# Patient Record
Sex: FEMALE | Race: WHITE | Employment: FULL TIME
[De-identification: names, ages, dates, MRNs, and addresses within clinical notes are randomized per-mention and may not be internally consistent; named-entity substitution may affect disease eponyms.]

---

## 2017-10-01 ENCOUNTER — HEALTH MAINTENANCE LETTER (OUTPATIENT)
Age: 22
End: 2017-10-01

## 2018-04-08 ENCOUNTER — HEALTH MAINTENANCE LETTER (OUTPATIENT)
Age: 23
End: 2018-04-08

## 2018-10-26 ENCOUNTER — HOSPITAL ENCOUNTER (EMERGENCY)
Facility: CLINIC | Age: 23
End: 2018-10-26

## 2018-10-26 ENCOUNTER — APPOINTMENT (OUTPATIENT)
Dept: ULTRASOUND IMAGING | Facility: CLINIC | Age: 23
End: 2018-10-26
Attending: PHYSICIAN ASSISTANT
Payer: COMMERCIAL

## 2018-10-26 ENCOUNTER — HOSPITAL ENCOUNTER (EMERGENCY)
Facility: CLINIC | Age: 23
Discharge: HOME | End: 2018-10-26
Attending: EMERGENCY MEDICINE | Admitting: EMERGENCY MEDICINE
Payer: COMMERCIAL

## 2018-10-26 ENCOUNTER — APPOINTMENT (OUTPATIENT)
Dept: GENERAL RADIOLOGY | Facility: CLINIC | Age: 23
End: 2018-10-26
Attending: PHYSICIAN ASSISTANT
Payer: COMMERCIAL

## 2018-10-26 VITALS
BODY MASS INDEX: 31.86 KG/M2 | RESPIRATION RATE: 16 BRPM | DIASTOLIC BLOOD PRESSURE: 73 MMHG | SYSTOLIC BLOOD PRESSURE: 142 MMHG | OXYGEN SATURATION: 97 % | HEIGHT: 67 IN | WEIGHT: 203 LBS | TEMPERATURE: 99 F

## 2018-10-26 DIAGNOSIS — R10.12 LUQ ABDOMINAL PAIN: ICD-10-CM

## 2018-10-26 DIAGNOSIS — M25.512 CHRONIC LEFT SHOULDER PAIN: ICD-10-CM

## 2018-10-26 DIAGNOSIS — G89.29 CHRONIC LEFT SHOULDER PAIN: ICD-10-CM

## 2018-10-26 DIAGNOSIS — R07.89 ATYPICAL CHEST PAIN: ICD-10-CM

## 2018-10-26 LAB
ALBUMIN SERPL-MCNC: 4 G/DL (ref 3.4–5)
ALP SERPL-CCNC: 73 U/L (ref 40–150)
ALT SERPL W P-5'-P-CCNC: 22 U/L (ref 0–50)
ANION GAP SERPL CALCULATED.3IONS-SCNC: 9 MMOL/L (ref 3–14)
AST SERPL W P-5'-P-CCNC: 13 U/L (ref 0–45)
BASOPHILS # BLD AUTO: 0.1 10E9/L (ref 0–0.2)
BASOPHILS NFR BLD AUTO: 1.1 %
BILIRUB SERPL-MCNC: 0.4 MG/DL (ref 0.2–1.3)
BUN SERPL-MCNC: 9 MG/DL (ref 7–30)
CALCIUM SERPL-MCNC: 8.8 MG/DL (ref 8.5–10.1)
CHLORIDE SERPL-SCNC: 110 MMOL/L (ref 94–109)
CO2 SERPL-SCNC: 23 MMOL/L (ref 20–32)
CREAT SERPL-MCNC: 0.68 MG/DL (ref 0.52–1.04)
DIFFERENTIAL METHOD BLD: ABNORMAL
EOSINOPHIL # BLD AUTO: 0.2 10E9/L (ref 0–0.7)
EOSINOPHIL NFR BLD AUTO: 2.5 %
ERYTHROCYTE [DISTWIDTH] IN BLOOD BY AUTOMATED COUNT: 15.4 % (ref 10–15)
GFR SERPL CREATININE-BSD FRML MDRD: >90 ML/MIN/1.7M2
GLUCOSE SERPL-MCNC: 84 MG/DL (ref 70–99)
HCT VFR BLD AUTO: 36.5 % (ref 35–47)
HGB BLD-MCNC: 11.7 G/DL (ref 11.7–15.7)
IMM GRANULOCYTES # BLD: 0 10E9/L (ref 0–0.4)
IMM GRANULOCYTES NFR BLD: 0.5 %
INTERPRETATION ECG - MUSE: NORMAL
LIPASE SERPL-CCNC: 547 U/L (ref 73–393)
LYMPHOCYTES # BLD AUTO: 1.4 10E9/L (ref 0.8–5.3)
LYMPHOCYTES NFR BLD AUTO: 21.4 %
MCH RBC QN AUTO: 26.8 PG (ref 26.5–33)
MCHC RBC AUTO-ENTMCNC: 32.1 G/DL (ref 31.5–36.5)
MCV RBC AUTO: 84 FL (ref 78–100)
MONOCYTES # BLD AUTO: 0.3 10E9/L (ref 0–1.3)
MONOCYTES NFR BLD AUTO: 5.1 %
NEUTROPHILS # BLD AUTO: 4.5 10E9/L (ref 1.6–8.3)
NEUTROPHILS NFR BLD AUTO: 69.4 %
NRBC # BLD AUTO: 0 10*3/UL
NRBC BLD AUTO-RTO: 0 /100
PLATELET # BLD AUTO: 351 10E9/L (ref 150–450)
POTASSIUM SERPL-SCNC: 3.8 MMOL/L (ref 3.4–5.3)
PROT SERPL-MCNC: 7.2 G/DL (ref 6.8–8.8)
RBC # BLD AUTO: 4.37 10E12/L (ref 3.8–5.2)
SODIUM SERPL-SCNC: 142 MMOL/L (ref 133–144)
TROPONIN I SERPL-MCNC: <0.015 UG/L (ref 0–0.04)
WBC # BLD AUTO: 6.5 10E9/L (ref 4–11)

## 2018-10-26 PROCEDURE — 93005 ELECTROCARDIOGRAM TRACING: CPT

## 2018-10-26 PROCEDURE — 71046 X-RAY EXAM CHEST 2 VIEWS: CPT

## 2018-10-26 PROCEDURE — 25000132 ZZH RX MED GY IP 250 OP 250 PS 637: Performed by: PHYSICIAN ASSISTANT

## 2018-10-26 PROCEDURE — 83690 ASSAY OF LIPASE: CPT | Performed by: PHYSICIAN ASSISTANT

## 2018-10-26 PROCEDURE — 85025 COMPLETE CBC W/AUTO DIFF WBC: CPT | Performed by: PHYSICIAN ASSISTANT

## 2018-10-26 PROCEDURE — 25000125 ZZHC RX 250: Performed by: PHYSICIAN ASSISTANT

## 2018-10-26 PROCEDURE — 84484 ASSAY OF TROPONIN QUANT: CPT | Performed by: PHYSICIAN ASSISTANT

## 2018-10-26 PROCEDURE — 25000128 H RX IP 250 OP 636: Performed by: PHYSICIAN ASSISTANT

## 2018-10-26 PROCEDURE — 96374 THER/PROPH/DIAG INJ IV PUSH: CPT

## 2018-10-26 PROCEDURE — 80053 COMPREHEN METABOLIC PANEL: CPT | Performed by: PHYSICIAN ASSISTANT

## 2018-10-26 PROCEDURE — 76705 ECHO EXAM OF ABDOMEN: CPT

## 2018-10-26 PROCEDURE — 99285 EMERGENCY DEPT VISIT HI MDM: CPT | Mod: 25

## 2018-10-26 RX ORDER — LIDOCAINE 50 MG/G
PATCH TOPICAL
Qty: 30 PATCH | Refills: 0 | Status: SHIPPED | OUTPATIENT
Start: 2018-10-26

## 2018-10-26 RX ORDER — ACETAMINOPHEN 325 MG/1
650 TABLET ORAL ONCE
Status: COMPLETED | OUTPATIENT
Start: 2018-10-26 | End: 2018-10-26

## 2018-10-26 RX ORDER — LORAZEPAM 2 MG/ML
1 INJECTION INTRAMUSCULAR ONCE
Status: COMPLETED | OUTPATIENT
Start: 2018-10-26 | End: 2018-10-26

## 2018-10-26 RX ADMIN — ACETAMINOPHEN 650 MG: 325 TABLET, FILM COATED ORAL at 16:10

## 2018-10-26 RX ADMIN — LORAZEPAM 1 MG: 2 INJECTION, SOLUTION INTRAMUSCULAR; INTRAVENOUS at 16:11

## 2018-10-26 RX ADMIN — LIDOCAINE HYDROCHLORIDE 15 ML: 20 SOLUTION ORAL; TOPICAL at 16:10

## 2018-10-26 ASSESSMENT — ENCOUNTER SYMPTOMS
FEVER: 0
CHILLS: 0
MYALGIAS: 1
ABDOMINAL PAIN: 1
DIARRHEA: 0
COUGH: 0
SHORTNESS OF BREATH: 0
VOMITING: 0
NAUSEA: 0
CONSTIPATION: 0

## 2018-10-26 NOTE — ED PROVIDER NOTES
"Emergency Department Attending Supervision Note  10/26/2018  3:28 PM      I evaluated this patient in conjunction with Wendy Castillo PA-c      Briefly, the patient presented with shoulder pain, which is chronic since getting perforated ulcer in stomach. 1 hour prior to arrival. Sudden right chest pain, now pressure like. Worse with movements. No family history of blood clots.  No hemoptysis. No estrogens.    She reports chronic LUQ pain, which is worse over the past 1 week.    On my exam,   /73  Temp 99  F (37.2  C) (Oral)  Resp 16  Ht 1.702 m (5' 7\")  Wt 92.1 kg (203 lb)  SpO2 97%  BMI 31.79 kg/m2     Tearful, anxious  Reproducible right chest pain  LUQ tenderness  Breath sounds normal and equal  No lower extremity swelling.    Results:  Labs Ordered and Resulted from Time of ED Arrival Up to the Time of Departure from the ED   CBC WITH PLATELETS DIFFERENTIAL - Abnormal; Notable for the following:        Result Value    RDW 15.4 (*)     All other components within normal limits   COMPREHENSIVE METABOLIC PANEL - Abnormal; Notable for the following:     Chloride 110 (*)     All other components within normal limits   LIPASE - Abnormal; Notable for the following:     Lipase 547 (*)     All other components within normal limits   TROPONIN I   PERIPHERAL IV CATHETER      US Abdomen Limited (RUQ)   Final Result   IMPRESSION: Normal right upper quadrant ultrasound.       ALEX CARDOSO MD      XR Chest 2 Views   Final Result   IMPRESSION: Normal two views of the chest.       ALEX CARDOSO MD         ED course:    Pinky Grimes is a 23 year old female here with right-sided chest pain.  Is very reproducible on exam.  EKG shows no acute ischemic changes.  She also is having pain in her left shoulder and left upper quadrant which is more chronic for her.  It has been slightly increased over the past week.  Troponin is undetectable.  She is PERC negative.  Lipase did return slightly elevated " although not 3 times normal.  Unclear of the clinical significance of this.  Ultrasound was obtained and showed a normal right upper quadrant ultrasound.  Recommended follow-up with primary care clinician next week.    Diagnosis    ICD-10-CM    1. Atypical chest pain R07.89    2. LUQ abdominal pain R10.12    3. Chronic left shoulder pain M25.512     G89.29          Jaime Gonzalez MD [place], Aaron Joseph, MD  10/26/18 2228

## 2018-10-26 NOTE — ED AVS SNAPSHOT
Emergency Department    [address] [place] [address]    Phone:  [phone]    Fax:  [phone]                                       Pinky Grimes   MRN: [id number]    Department:   Emergency Department   Date of Visit:  10/26/2018           After Visit Summary Signature Page     I have received my discharge instructions, and my questions have been answered. I have discussed any challenges I see with this plan with the nurse or doctor.    ..........................................................................................................................................  Patient/Patient Representative Signature      ..........................................................................................................................................  Patient Representative Print Name and Relationship to Patient    ..................................................               ................................................  Date                                   Time    ..........................................................................................................................................  Reviewed by Signature/Title    ...................................................              ..............................................  Date                                               Time          22EPIC Rev 08/18

## 2018-10-26 NOTE — DISCHARGE INSTRUCTIONS
Begin taking the omeprazole as prescribed.  A list of local primary care providers has been provided to you.  Call a clinic within the next few days to establish care.  Also have your lipase rechecked with this provider.  Continue to use Tylenol, heat, gentle stretching for your shoulder and chest discomfort.  The lidocaine patches of the shoulder can also help with symptoms.  Wear these for 12 hours at a time.  Then  keep him off for 12 hours.  Return to the ED for any changing or worsening symptoms, worsening chest pain, shortness of breath, uncontrolled nausea or vomiting, fevers >101, new concerns.    Discharge Instructions  Chest Pain    You have been seen today for chest pain or discomfort.  At this time, your doctor has found no signs that your chest pain is due to a serious or life-threatening condition, (or you have declined more testing and/or admission to the hospital). However, sometimes there is a serious problem that does not show up right away. Your evaluation today may not be complete and you may need further testing and evaluation.     You need to follow-up with your regular doctor within 3 days.    Return to the Emergency Department if:    Your chest pain changes, gets worse, starts to happen more often, or comes with less activity.    You are short of breath.    You get very weak or tired.    You pass out or faint.    You have any new symptoms, like fever, cough, numb legs, or you cough up blood.    You have anything else that worries you.    Until you follow-up with your regular doctor please do the following:    Take one aspirin daily unless you have an allergy or are told not to by your doctor.    If a stress test appointment has been made, go to the appointment.    If you have questions, contact your regular doctor.    If your doctor today has told you to follow-up with your regular doctor, it is very important that you make an appointment with your clinic and go to the appointment.  If you do  not follow-up with your primary doctor, it may result in missing an important development which could result in permanent injury or disability and/or lasting pain.  If there is any problem keeping your appointment, call your doctor or return to the Emergency Department.    If you were given a prescription for medicine here today, be sure to read all of the information (including the package insert) that comes with your prescription.  This will include important information about the medicine, its side effects, and any warnings that you need to know about.  The pharmacist who fills the prescription can provide more information and answer questions you may have about the medicine.  If you have questions or concerns that the pharmacist cannot address, please call or return to the Emergency Department.     Opioid Medication Information    Pain medications are among the most commonly prescribed medicines, so we are including this information for all our patients. If you did not receive pain medication or get a prescription for pain medicine, you can ignore it.     You may have been given a prescription for an opioid (narcotic) pain medicine and/or have received a pain medicine while here in the Emergency Department. These medicines can make you drowsy or impaired. You must not drive, operate dangerous equipment, or engage in any other dangerous activities while taking these medications. If you drive while taking these medications, you could be arrested for DUI, or driving under the influence. Do not drink any alcohol while you are taking these medications.     Opioid pain medications can cause addiction. If you have a history of chemical dependency of any type, you are at a higher risk of becoming addicted to pain medications.  Only take these prescribed medications to treat your pain when all other options have been tried. Take it for as short a time and as few doses as possible. Store your pain pills in a secure place,  as they are frequently stolen and provide a dangerous opportunity for children or visitors in your house to start abusing these powerful medications. We will not replace any lost or stolen medicine.  As soon as your pain is better, you should flush all your remaining medication.     Many prescription pain medications contain Tylenol  (acetaminophen), including Vicodin , Tylenol #3 , Norco , Lortab , and Percocet .  You should not take any extra pills of Tylenol  if you are using these prescription medications or you can get very sick.  Do not ever take more than 3000 mg of acetaminophen in any 24 hour period.    All opioids tend to cause constipation. Drink plenty of water and eat foods that have a lot of fiber, such as fruits, vegetables, prune juice, apple juice and high fiber cereal.  Take a laxative if you don t move your bowels at least every other day. Miralax , Milk of Magnesia, Colace , or Senna  can be used to keep you regular.      Remember that you can always come back to the Emergency Department if you are not able to see your regular doctor in the amount of time listed above, if you get any new symptoms, or if there is anything that worries you.

## 2018-10-26 NOTE — ED AVS SNAPSHOT
Emergency Department    [address] [facility] [address]    Phone:  [phone]    Fax:  [phone]                                       Pinky Grimes   MRN: [id number]    Department:   Emergency Department   Date of Visit:  10/26/2018           Patient Information     Date Of Birth          1995        Your diagnoses for this visit were:     Atypical chest pain     LUQ abdominal pain     Chronic left shoulder pain        You were seen by Jaime Gonzalez MD.      Follow-up Information     Go to  Emergency Department.    Specialty:  EMERGENCY MEDICINE    Why:  If symptoms worsen    Contact information:    [address] [facility] [address] [phone]        Follow up with [facility]. Call in 1 day.    Specialties:  Podiatry, Internal Medicine, Family Medicine    Why:  Or call one of the clinics on the handout provided to you    Contact information:    [address] [address] [phone]        Discharge Instructions       Begin taking the omeprazole as prescribed.  A list of local primary care providers has been provided to you.  Call a clinic within the next few days to establish care.  Also have your lipase rechecked with this provider.  Continue to use Tylenol, heat, gentle stretching for your shoulder and chest discomfort.  The lidocaine patches of the shoulder can also help with symptoms.  Wear these for 12 hours at a time.  Then  keep him off for 12 hours.  Return to the ED for any changing or worsening symptoms, worsening chest pain, shortness of breath, uncontrolled nausea or vomiting, fevers >101, new concerns.    Discharge Instructions  Chest Pain    You have been seen today for chest pain or discomfort.  At this time, your doctor has found no signs that your chest pain is due to a serious or life-threatening condition, (or you have declined more testing and/or admission to the hospital).  However, sometimes there is a serious problem that does not show up right away. Your evaluation today may not be complete and you may need further testing and evaluation.     You need to follow-up with your regular doctor within 3 days.    Return to the Emergency Department if:    Your chest pain changes, gets worse, starts to happen more often, or comes with less activity.    You are short of breath.    You get very weak or tired.    You pass out or faint.    You have any new symptoms, like fever, cough, numb legs, or you cough up blood.    You have anything else that worries you.    Until you follow-up with your regular doctor please do the following:    Take one aspirin daily unless you have an allergy or are told not to by your doctor.    If a stress test appointment has been made, go to the appointment.    If you have questions, contact your regular doctor.    If your doctor today has told you to follow-up with your regular doctor, it is very important that you make an appointment with your clinic and go to the appointment.  If you do not follow-up with your primary doctor, it may result in missing an important development which could result in permanent injury or disability and/or lasting pain.  If there is any problem keeping your appointment, call your doctor or return to the Emergency Department.    If you were given a prescription for medicine here today, be sure to read all of the information (including the package insert) that comes with your prescription.  This will include important information about the medicine, its side effects, and any warnings that you need to know about.  The pharmacist who fills the prescription can provide more information and answer questions you may have about the medicine.  If you have questions or concerns that the pharmacist cannot address, please call or return to the Emergency Department.     Opioid Medication Information    Pain medications are among the most commonly  prescribed medicines, so we are including this information for all our patients. If you did not receive pain medication or get a prescription for pain medicine, you can ignore it.     You may have been given a prescription for an opioid (narcotic) pain medicine and/or have received a pain medicine while here in the Emergency Department. These medicines can make you drowsy or impaired. You must not drive, operate dangerous equipment, or engage in any other dangerous activities while taking these medications. If you drive while taking these medications, you could be arrested for DUI, or driving under the influence. Do not drink any alcohol while you are taking these medications.     Opioid pain medications can cause addiction. If you have a history of chemical dependency of any type, you are at a higher risk of becoming addicted to pain medications.  Only take these prescribed medications to treat your pain when all other options have been tried. Take it for as short a time and as few doses as possible. Store your pain pills in a secure place, as they are frequently stolen and provide a dangerous opportunity for children or visitors in your house to start abusing these powerful medications. We will not replace any lost or stolen medicine.  As soon as your pain is better, you should flush all your remaining medication.     Many prescription pain medications contain Tylenol  (acetaminophen), including Vicodin , Tylenol #3 , Norco , Lortab , and Percocet .  You should not take any extra pills of Tylenol  if you are using these prescription medications or you can get very sick.  Do not ever take more than 3000 mg of acetaminophen in any 24 hour period.    All opioids tend to cause constipation. Drink plenty of water and eat foods that have a lot of fiber, such as fruits, vegetables, prune juice, apple juice and high fiber cereal.  Take a laxative if you don t move your bowels at least every other day. Miralax , Milk of  Magnesia, Colace , or Senna  can be used to keep you regular.      Remember that you can always come back to the Emergency Department if you are not able to see your regular doctor in the amount of time listed above, if you get any new symptoms, or if there is anything that worries you.      Discharge References/Attachments     EPIGASTRIC PAIN (UNCERTAIN CAUSE) (ENGLISH)    CHEST WALL PAIN, COSTOCHONDRITIS (ENGLISH)      24 Hour Appointment Hotline       To make an appointment at any [facility], call [phone] ([phone]). If you don't have a family doctor or clinic, we will help you find one. [facility] are conveniently located to serve the needs of you and your family.             Review of your medicines      START taking        Dose / Directions Last dose taken    lidocaine 5 % Patch   Commonly known as:  LIDODERM   Quantity:  30 patch        Apply up to 3 patches to painful area at once for up to 12 h within a 24 h period.  Remove after 12 hours.   Refills:  0        omeprazole 20 MG CR capsule   Commonly known as:  priLOSEC   Dose:  20 mg   Quantity:  30 capsule        Take 1 capsule (20 mg) by mouth daily   Refills:  0                Prescriptions were sent or printed at these locations (2 Prescriptions)                   [facility] [address] [place] [address] AT SEC OF [address] [address]    Telephone:  [phone]   Fax:  [phone]   Hours:                  E-Prescribed (2 of 2)         lidocaine (LIDODERM) 5 % Patch               omeprazole (PRILOSEC) 20 MG CR capsule                Procedures and tests performed during your visit     CBC with platelets differential    Comprehensive metabolic panel    EKG 12 lead    Lipase    Peripheral IV catheter    Troponin I    US Abdomen Limited (RUQ)    XR Chest 2 Views      Orders Needing Specimen Collection     None      Pending Results     No orders found from  10/24/2018 to 10/27/2018.            Pending Culture Results     No orders found from 10/24/2018 to 10/27/2018.            Pending Results Instructions     If you had any lab results that were not finalized at the time of your Discharge, you can call the ED Lab Result RN at [phone]. You will be contacted by this team for any positive Lab results or changes in treatment. The nurses are available 7 days a week from 10A to 6:30P.  You can leave a message 24 hours per day and they will return your call.        Test Results From Your Hospital Stay        10/26/2018  4:56 PM      Component Results     Component Value Ref Range & Units Status    WBC 6.5 4.0 - 11.0 10e9/L Final    RBC Count 4.37 3.8 - 5.2 10e12/L Final    Hemoglobin 11.7 11.7 - 15.7 g/dL Final    Hematocrit 36.5 35.0 - 47.0 % Final    MCV 84 78 - 100 fl Final    MCH 26.8 26.5 - 33.0 pg Final    MCHC 32.1 31.5 - 36.5 g/dL Final    RDW 15.4 (H) 10.0 - 15.0 % Final    Platelet Count 351 150 - 450 10e9/L Final    Diff Method Automated Method  Final    % Neutrophils 69.4 % Final    % Lymphocytes 21.4 % Final    % Monocytes 5.1 % Final    % Eosinophils 2.5 % Final    % Basophils 1.1 % Final    % Immature Granulocytes 0.5 % Final    Nucleated RBCs 0 0 /100 Final    Absolute Neutrophil 4.5 1.6 - 8.3 10e9/L Final    Absolute Lymphocytes 1.4 0.8 - 5.3 10e9/L Final    Absolute Monocytes 0.3 0.0 - 1.3 10e9/L Final    Absolute Eosinophils 0.2 0.0 - 0.7 10e9/L Final    Absolute Basophils 0.1 0.0 - 0.2 10e9/L Final    Abs Immature Granulocytes 0.0 0 - 0.4 10e9/L Final    Absolute Nucleated RBC 0.0  Final         10/26/2018  4:44 PM      Component Results     Component Value Ref Range & Units Status    Sodium 142 133 - 144 mmol/L Final    Potassium 3.8 3.4 - 5.3 mmol/L Final    Chloride 110 (H) 94 - 109 mmol/L Final    Carbon Dioxide 23 20 - 32 mmol/L Final    Anion Gap 9 3 - 14 mmol/L Final    Glucose 84 70 - 99 mg/dL Final    Urea Nitrogen 9 7 - 30 mg/dL Final     Creatinine 0.68 0.52 - 1.04 mg/dL Final    GFR Estimate >90 >60 mL/min/1.7m2 Final    Non  GFR Calc    GFR Estimate If Black >90 >60 mL/min/1.7m2 Final    African American GFR Calc    Calcium 8.8 8.5 - 10.1 mg/dL Final    Bilirubin Total 0.4 0.2 - 1.3 mg/dL Final    Albumin 4.0 3.4 - 5.0 g/dL Final    Protein Total 7.2 6.8 - 8.8 g/dL Final    Alkaline Phosphatase 73 40 - 150 U/L Final    ALT 22 0 - 50 U/L Final    AST 13 0 - 45 U/L Final         10/26/2018  4:39 PM      Component Results     Component Value Ref Range & Units Status    Lipase 547 (H) 73 - 393 U/L Final         10/26/2018  4:44 PM      Component Results     Component Value Ref Range & Units Status    Troponin I ES <0.015 0.000 - 0.045 ug/L Final    The 99th percentile for upper reference range is 0.045 ug/L.  Troponin values   in the range of 0.045 - 0.120 ug/L may be associated with risks of adverse   clinical events.           10/26/2018  4:32 PM      Narrative     CHEST TWO VIEWS 10/26/2018 4:25 PM     HISTORY: Chest pain.     COMPARISON: None.    FINDINGS: Heart size and pulmonary vascularity are within normal  limits. The lungs are clear. No pneumothorax or pleural effusion.         Impression     IMPRESSION: Normal two views of the chest.     ALEX CARDOSO MD         10/26/2018  6:23 PM      Narrative     RIGHT UPPER QUADRANT ULTRASOUND 10/26/2018 6:07 PM    HISTORY: Mild right upper quadrant pain, largely left upper quadrant  pain and mildly elevated lipase.     COMPARISON: None.    FINDINGS:   Gallbladder: Normal with no cholelithiasis, wall thickening or focal  tenderness.     Bile ducts: CHD is normal diameter. No intrahepatic biliary  dilatation.    Liver: Normal.     Pancreas: Normal.     Right kidney: Normal.         Impression     IMPRESSION: Normal right upper quadrant ultrasound.     ALEX CARDOSO MD                Clinical Quality Measure: Blood Pressure Screening     Your blood pressure was checked while you  "were in the emergency department today. The last reading we obtained was  BP: 142/73 . Please read the guidelines below about what these numbers mean and what you should do about them.  If your systolic blood pressure (the top number) is less than 120 and your diastolic blood pressure (the bottom number) is less than 80, then your blood pressure is normal. There is nothing more that you need to do about it.  If your systolic blood pressure (the top number) is 120-139 or your diastolic blood pressure (the bottom number) is 80-89, your blood pressure may be higher than it should be. You should have your blood pressure rechecked within a year by a primary care provider.  If your systolic blood pressure (the top number) is 140 or greater or your diastolic blood pressure (the bottom number) is 90 or greater, you may have high blood pressure. High blood pressure is treatable, but if left untreated over time it can put you at risk for heart attack, stroke, or kidney failure. You should have your blood pressure rechecked by a primary care provider within the next 4 weeks.  If your provider in the emergency department today gave you specific instructions to follow-up with your doctor or provider even sooner than that, you should follow that instruction and not wait for up to 4 weeks for your follow-up visit.        Thank you for choosing [facility]       Thank you for choosing [facility] for your care. Our goal is always to provide you with excellent care. Hearing back from our patients is one way we can continue to improve our services. Please take a few minutes to complete the written survey that you may receive in the mail after you visit with us. Thank you!        [facility] Inchart Information     [email or web address] lets you send messages to your doctor, view your test results, renew your prescriptions, schedule appointments and more. To sign up, go to [email or web address] . Click on \"Log in\" on the left side of the screen, which will take " "you to the Welcome page. Then click on \"Sign up Now\" on the right side of the page.     You will be asked to enter the access code listed below, as well as some personal information. Please follow the directions to create your username and password.     Your access code is: [email or web address]  Expires: 2019  4:26 PM     Your access code will  in 90 days. If you need help or a new code, please call your [facility] clinic or [phone].        Care EveryWhere ID     This is your Care EveryWhere ID. This could be used by other organizations to access your [facility] medical records  [id number]        Equal Access to Services     CUCO BHANDARI : Samy Hampton, leopoldo steven, teresa bell, velia mcelroy. [facility] [phone].    ATENCIÓN: Si habla español, tiene a sandoval disposición servicios gratuitos de asistencia lingüística. Llame al [phone].    We comply with applicable federal civil rights laws and [place] laws. We do not discriminate on the basis of race, color, national origin, age, disability, sex, sexual orientation, or gender identity.            After Visit Summary       This is your record. Keep this with you and show to your community pharmacist(s) and doctor(s) at your next visit.                  "

## 2019-06-25 ENCOUNTER — HOSPITAL ENCOUNTER (EMERGENCY)
Facility: CLINIC | Age: 24
Discharge: HOME | End: 2019-06-25
Attending: EMERGENCY MEDICINE | Admitting: EMERGENCY MEDICINE
Payer: COMMERCIAL

## 2019-06-25 VITALS
RESPIRATION RATE: 16 BRPM | TEMPERATURE: 98.6 F | SYSTOLIC BLOOD PRESSURE: 135 MMHG | BODY MASS INDEX: 32.18 KG/M2 | HEIGHT: 67 IN | WEIGHT: 205 LBS | DIASTOLIC BLOOD PRESSURE: 80 MMHG | OXYGEN SATURATION: 99 %

## 2019-06-25 DIAGNOSIS — F41.1 ANXIETY REACTION: ICD-10-CM

## 2019-06-25 PROCEDURE — 25000132 ZZH RX MED GY IP 250 OP 250 PS 637: Performed by: EMERGENCY MEDICINE

## 2019-06-25 PROCEDURE — 99283 EMERGENCY DEPT VISIT LOW MDM: CPT

## 2019-06-25 RX ORDER — LORAZEPAM 1 MG/1
1 TABLET ORAL ONCE
Status: COMPLETED | OUTPATIENT
Start: 2019-06-25 | End: 2019-06-25

## 2019-06-25 RX ORDER — LORAZEPAM 1 MG/1
1 TABLET ORAL
Qty: 5 TABLET | Refills: 0 | Status: SHIPPED | OUTPATIENT
Start: 2019-06-25

## 2019-06-25 RX ADMIN — LORAZEPAM 1 MG: 1 TABLET ORAL at 14:30

## 2019-06-25 ASSESSMENT — ENCOUNTER SYMPTOMS: NERVOUS/ANXIOUS: 1

## 2019-06-25 ASSESSMENT — MIFFLIN-ST. JEOR: SCORE: 1722.5

## 2019-06-25 NOTE — ED NOTES
Bed: ED16  Expected date: 6/25/19  Expected time: 1:40 PM  Means of arrival:   Comments:  TRIAGE

## 2019-06-25 NOTE — ED PROVIDER NOTES
"  History     Chief Complaint:  Anxiety      HPI   Pinky Grimes is a 24 year old female who presents to the emergency department today for evaluation of anxiety.  She reports symptoms of feeling shaky, jittery, hard to function, emotional, out of control crying, and the inability to sit still. She has been hospitalized two times for this in the past year. She is concerned today that her medications may need to be adjusted or that her recent adjustments are affecting her.    Allergies:  Amoxicillin     Medications:    Ativan  Lidoderm    Past Medical History:    Bipolar disorder  GHADA  Marijuana abuse    Past Surgical History:    Cyst Aspiration    Family History:    No family history on file.    Social History:  Smoking Status: never  Smokeless Tobacco: no  Alcohol Use: no   Marital Status:  Single [1]     Review of Systems   Psychiatric/Behavioral: Negative for self-injury. The patient is nervous/anxious.    All other systems reviewed and are negative.        Physical Exam     Patient Vitals for the past 24 hrs:   BP Temp Temp src Heart Rate Resp SpO2 Height Weight   06/25/19 1307 135/80 98.6  F (37  C) Oral 87 16 99 % 1.702 m (5' 7\") 93 kg (205 lb)        Physical Exam  Vitals: reviewed by me  General: Pt seen on [facility], [facility], cooperative, and alert to conversation  Eyes: Tracking well, clear conjunctiva BL  ENT: MMM, midline trachea.   Lungs: No tachypnea, no accessory muscle use. No respiratory distress.   CV: Rate as above, regular rhythm.    MSK: no peripheral edema or joint effusion.  No evidence of trauma  Skin: No rash, normal turgor and temperature  Neuro: Clear speech and no facial droop.  Psych: Not RIS, no e/o AH/VH, no SI or HI       Emergency Department Course   Interventions:  Ativan 1430 1 mg PO    Emergency Department Course:  Past medical records, nursing notes, and vitals reviewed.    1416: I performed an exam of the patient and obtained history, as documented above.     1557: " I rechecked the patient. Findings and plan explained to the Patient. Patient discharged home with instructions regarding supportive care, medications, and reasons to return. The importance of close follow-up was reviewed.          Impression & Plan      Medical Decision Making:  Pinky Grimes is a 24 year old female who presents to the emergency department today for evaluation of anxiety. She has no desire to hurt herself or anyone else, and states the atarax she is on at home is not really helping her. She has done well here with ativan x1, and although she is following with a mental health team, but they cannot get her in for one month, so I put in a mental health referral to her to expedite the process. She appears grateful for this, I reaffirmed she has no desire to harm herself, and feels improved after Ativan. Plan for discharge is clear to return to the ED with precautions discussed, red flags to come back to the ER were discussed.    Diagnosis:    ICD-10-CM    1. Anxiety reaction F41.1 MENTAL HEALTH REFERRAL  - Adult; Psychiatry and Medication Management; [place]iatry; [facility] [place] [facility] [phone].  Medication management & future refills will be returned to G PCP upon completion of evaluation; We vandana...       Disposition:  discharged to home    Discharge Medications:     Medication List      Started    LORazepam 1 MG tablet  Commonly known as:  ATIVAN  1 mg, Oral, AT BEDTIME PRN              Caitlin Gooden  6/25/2019    EMERGENCY DEPARTMENT  Scribe Disclosure:  I, Caitlin Gooden, am serving as a scribe at 5:11 PM on 6/25/2019 to document services personally performed by Neal Alves MD based on my observations and the provider's statements to me.       Josse Alves MD  06/25/19 3246

## 2019-06-25 NOTE — ED AVS SNAPSHOT
Emergency Department  [address] [place] [id number]  Phone:  [phone]  Fax:  [phone]                                    Pinky Grimes   MRN: [id number]    Department:   Emergency Department   Date of Visit:  6/25/2019           After Visit Summary Signature Page    I have received my discharge instructions, and my questions have been answered. I have discussed any challenges I see with this plan with the nurse or doctor.    ..........................................................................................................................................  Patient/Patient Representative Signature      ..........................................................................................................................................  Patient Representative Print Name and Relationship to Patient    ..................................................               ................................................  Date                                   Time    ..........................................................................................................................................  Reviewed by Signature/Title    ...................................................              ..............................................  Date                                               Time          22EPIC Rev 08/18

## 2023-02-20 ENCOUNTER — TRANSCRIBE ORDERS (OUTPATIENT)
Dept: OTHER | Age: 28
End: 2023-02-20

## 2023-02-20 DIAGNOSIS — G89.29 UPPER BACK PAIN, CHRONIC: ICD-10-CM

## 2023-02-20 DIAGNOSIS — M54.9 UPPER BACK PAIN, CHRONIC: ICD-10-CM

## 2023-02-20 DIAGNOSIS — R07.89 ANTERIOR CHEST WALL PAIN: Primary | ICD-10-CM

## 2024-05-09 NOTE — ED PROVIDER NOTES
History     Chief Complaint:  Chest pain    HPI   Pinky Grimes is a 23 year old female with history of perforated ulcer who presents to the ED today for evaluation of right-sided chest pain as well as left shoulder and left upper quadrant tenderness.  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  Since that time, she has felt persistent chest tightness/pressure and occasional sharp shooting pains when rotating her body a certain direction.  She has never had similar symptoms in the past.  Denies history of hypertension or hyperlipidemia.  Is never been evaluated by cardiology.  No family history of hypertension, hyperlipidemia, cardiac events.  Denies history of clotting disorder, DVT/PE, birth control or hormonal supplementation use, recent travel, recent hospitalizations or surgeries, calf pain or swelling.    The patient also notes that a few years ago she had a perforated ulcer that caused her to have persistent left upper quadrant pain and left shoulder pain.  She had surgical repair of the ulcer.  She does not take a daily medication for this.  Since that time, she has had persistent left shoulder discomfort as well as chronic left upper quadrant tenderness.  She has been evaluated extensively by orthopedics for her shoulder discomfort.  She has had x-rays and MRIs as well as PT without symptom relief.  She is also been evaluated by gastroenterology numerous times for her left upper quadrant pain.  She notes that her last evaluation was early this spring.  Over the past week, she has noted increase in left upper quadrant and left shoulder pain.  There was not a known cause for her previous ulcer.  Denies ibuprofen or alcohol use.  Denies any nausea or vomiting, urinary changes, bowel movement changes, fever,  Continue mercaptopurine 50mg once a day.    Continue pantoprazole 40mg daily.     Fibroscan ordered, we will call to schedule.    Labs ordered, please go to lab to get completed in 6 months, more recommendations pending results.      Please call the office with questions or concerns, [phone].    Follow up in 6 months or sooner if needed.    "cough.    Allergies:  Amoxicillin  Ibuprofen     Medications:    The patient is not currently taking any prescribed medications.    Past Medical History:    The patient does not have any past pertinent medical history.    Past Surgical History:    History reviewed. No pertinent surgical history.    Family History:    No family history on file.    Social History:  Smoking status: No  Presented to the ED with her friend and mother    Review of Systems   Constitutional: Negative for chills and fever.   Respiratory: Negative for cough and shortness of breath.    Cardiovascular: Positive for chest pain.   Gastrointestinal: Positive for abdominal pain (LUQ). Negative for constipation, diarrhea, nausea and vomiting.   Musculoskeletal: Positive for myalgias (Left shoulder).   All other systems reviewed and are negative.    Physical Exam     Patient Vitals for the past 24 hrs:   BP Temp Temp src Heart Rate Resp SpO2 Height Weight   10/26/18 1442 142/73 99  F (37.2  C) Oral 92 16 97 % 1.702 m (5' 7\") 92.1 kg (203 lb)       Physical Exam  General: Well appearing, well nourished. Normal mood and affect.  Skin: Good turgor, no rash, no unusual bruising or prominent lesions.  HEENT: Head: Normocephalic, atraumatic, no visible masses.   Eyes: Conjunctiva clear, sclera non-icteric, EOM intact, PERRL.   Throat/pharynx: Mucous membranes moist, no mucosal lesions. Mucosa non-inflamed, no tonsillar hypertrophy or exudate.   Cardiac: Normal rate and regular rhythm, no murmur or gallop.   Lungs: Clear to auscultation.  Abdomen: Left upper quadrant tenderness to palpation.  Bowel sounds normal, no organomegaly, masses, or hernia. No guarding or rebound tenderness.   Musculoskeletal: Left trapezius muscle tension.  Full range of motion of shoulder with minor discomfort.  Reproducible right-sided chest pain.  Normal gait and station. No calf tenderness or swelling.   Neurologic: Oriented x 3. GCS: 15.  Psychiatric: Intact recent and remote " memory, judgment and insight, normal mood and affect.     Emergency Department Course   ECG (14:41:39):  Rate 80 bpm. TN interval 132. QRS duration 76. QT/QTc 400/461. P-R-T axes 46 40 63. Sinus rhythm with marked sinus arrhythmia. Otherwise normal ECG. Interpreted at 1723 by Jaime Gonzalez MD.    Imaging:  Radiographic findings were communicated with the patient who voiced understanding of the findings.    XR Chest, 2 views  Normal two views of the chest.   As read by Radiology.    Laboratory:  CBC: WNL (WBC 6.5, HGB 11.7, )  BMP: Chloride 110 (H), O/W WNL (Creatinine 0.68)  Lipase: 547 (H)  Troponin: <0.015    Interventions:  1610: GI Cocktail - Maalox 15 mL, Viscous Lidocaine 15 mL, 30 mL suspension PO  1610: Tylenol 650 mg oral   1611: Ativan 1 mg IV    Emergency Department Course:  Past medical records, nursing notes, and vitals reviewed.  1505: I performed an exam of the patient and obtained history, as documented above.     1715: I updated the patient has the results of her workup thus far.  All questions were answered.  Ultrasound imaging discussed.  Patient feels mild improvement in her symptoms.    1853: I rechecked the patient. Findings and plan explained to the Patient. Patient discharged home with instructions regarding supportive care, medications, and reasons to return. The importance of close follow-up was reviewed.     Impression & Plan      Medical Decision Making:  Pinky Grimes is a 22 yo female who presented to the ED today for evaluation of chest pain, abdominal pain, shoulder pain. Details of the patient's history can be seen in the HPI. Differential diagnosis included ACS, dissection, pneumothorax, pneumonia, PE, costochondritis, pericarditis, panic attack, gastric reflux, amongst others. Workup in the ED yielded no acute abnormalities with CBC and CMP. Initial troponin returned negative. ECG did not show any evidence of STEMI. CXR was clear. The patient's heart score was found  to be 1, low risk. Interventions here in the ED included tylenol, GI cocktail, ativan. The patient was low risk on Well's criteria and PERC negative. D-dimer was not completed.  Patient's lipase had returned mildly elevated at 547.  Due to this finding as well as the patient's upper abdominal pain, ultrasound was completed to evaluate for any signs of cholelithiasis or cholecystitis.  Imaging results returned without any signs of stone or biliary duct dilation.  Due to the patient's history of peptic ulcer, I do suspect that her upper left quadrant pain is due to underlying gastritis or gastric reflux.  She is not currently taking any daily medications for the symptoms.  She has been having ongoing discomfort, I did suggest that she begin taking daily omeprazole.  This was prescribed for her.  On exam, the patient had left trapezius tension.  I suspect that this is exacerbated through her job as being a hairdresser.  I provided her with topical lidocaine patches to help with this discomfort.  I also advised Tylenol use to help with her symptoms.  Her chest discomfort was reproducible.  She is at low risk and has a low heart score.  I also think that her chest discomfort was musculoskeletal in origin.  Tylenol, heat, massage, gentle stretching was advised for this as well as her left shoulder discomfort.  The patient requested information for a new local primary care provider.  I did provide her with a list of local clinics and advised that she call within the next few days to establish care.  I also advised her to have her lipase rechecked within the next few days.  There is unclear cause for this mild elevation in lipase.  I do not suspect acute pancreatitis at this time as this is not 3x above upper limits of normal.  Additionally the patient has no alcohol use and there is no pathology seen on ultrasound imaging here today.  She was given strict return precautions to present back to the ED for any changing or  worsening abdominal pain, uncontrolled nausea or vomiting,  Fevers new concerns.>101F, avoiding ibuprofen, eating small meals, avoiding spicy or acidic foods were also discussed to help with her symptoms.  All the findings as noted above were described in great detail with the patient and her mother.  All questions were answered prior to the patient's discharge.  She was in agreement with the treatment plan as stated above.    Diagnosis:    ICD-10-CM   1. Atypical chest pain R07.89   2. LUQ abdominal pain R10.12   3. Chronic left shoulder pain M25.512    G89.29     Disposition:  discharged to home    Discharge Medications:  New Prescriptions   LIDOCAINE (LIDODERM) 5 % PATCH Apply up to 3 patches to painful area at once for up to 12 h within a 24 h period. Remove after 12 hours.   OMEPRAZOLE (PRILOSEC) 20 MG CR CAPSULE Take 1 capsule (20 mg) by mouth daily     Tresa Valentine  10/26/2018    EMERGENCY DEPARTMENT    This was created at least in part with a voice recognition software. Mistakes/typos may be present.        Wendy Castillo PA  10/26/18 1941